# Patient Record
Sex: MALE | Race: WHITE | Employment: FULL TIME | URBAN - METROPOLITAN AREA
[De-identification: names, ages, dates, MRNs, and addresses within clinical notes are randomized per-mention and may not be internally consistent; named-entity substitution may affect disease eponyms.]

---

## 2020-08-17 ENCOUNTER — OFFICE VISIT CONVERTED (OUTPATIENT)
Dept: OTOLARYNGOLOGY | Facility: CLINIC | Age: 30
End: 2020-08-17
Attending: NURSE PRACTITIONER

## 2020-08-27 ENCOUNTER — OFFICE VISIT CONVERTED (OUTPATIENT)
Dept: OTOLARYNGOLOGY | Facility: CLINIC | Age: 30
End: 2020-08-27
Attending: NURSE PRACTITIONER

## 2020-08-27 ENCOUNTER — CONVERSION ENCOUNTER (OUTPATIENT)
Dept: OTHER | Facility: HOSPITAL | Age: 30
End: 2020-08-27

## 2021-05-10 NOTE — H&P
History and Physical      Patient Name: Axel Landaverde   Patient ID: [id number]   Sex: Female   YOB: 1990    Referring Provider: Natalia GARCIA    Visit Date: August 17, 2020    Provider: ELZA Vergara   Location: Ear, N[facility], and Thro[facility]   Location Address: [address] [place] [address], [address]  [place], [place]  [id number]   Location Phone: [phone]          Chief Complaint     1.  Cerumen impaction       History Of Present Illness  Axel Landaverde is a 29 year old female who presents to the office today as a consult from Natalia GARCIA.      He presents to the clinic today for removal of left-sided cerumen impaction.  He reports that he has been seen in the emergency room and also by his PCP who was unable to clear his left-sided cerumen impaction.  He reports he has had issues with this for quite some time.  He has had no issues with middle ear infections, otorrhea or otalgia.  He does report that he feels like his hearing is worse in the left ear and he has intermittent tinnitus.  He reports that he has previously used Q-tips but due to his cerumen impactions he has stopped doing this.  He has not been using any cerumen softening drops.       Past Medical History  Left ear impacted cerumen         Past Surgical History  *Denies any surgical procedures         Medication List  Vitamin D3 50 mcg (2,000 unit) oral tablet         Allergy List  NO KNOWN DRUG ALLERGIES         Family Medical History  Family history of stroke         Social History  Tobacco (Never)         Review of Systems  · Constitutional  o Denies  o : fever, night sweats, weight loss  · Eyes  o Denies  o : discharge from eye, impaired vision  · HENT  o Admits  o : *See HPI  · Cardiovascular  o Denies  o : chest pain, irregular heart beats  · Respiratory  o Denies  o : shortness of breath, wheezing, coughing up blood  · Gastrointestinal  o Denies  o : heartburn, reflux, vomiting blood  · Genitourinary  o Denies  o :  "frequency  · Integument  o Denies  o : rash, skin dryness  · Neurologic  o Denies  o : seizures, loss of balance, loss of consciousness, dizziness  · Endocrine  o Denies  o : cold intolerance, heat intolerance  · Heme-Lymph  o Denies  o : easy bleeding, anemia      Vitals  Date Time BP Position Site L\R Cuff Size HR RR TEMP (F) WT  HT  BMI kg/m2 BSA m2 O2 Sat        08/17/2020 03:39 PM        99 140lbs 0oz 5'  11\" 19.53 1.78           Physical Examination  · Constitutional  o Appearance  o : well developed, well-nourished, alert and in no acute distress, voice clear and strong  · Head and Face  o Head  o :   § Inspection  § : no deformities or lesions  o Face  o :   § Inspection  § : No facial lesions; House-Brackmann I/VI bilaterally  § Palpation  § : No TMJ crepitus nor  muscle tenderness bilaterally  · Eyes  o Vision  o :   § Visual Fields  § : Extraocular movements are intact. No spontaneous or gaze-induced nystagmus.  o Conjunctivae  o : clear  o Sclerae  o : clear  o Pupils and Irises  o : pupils equal, round, and reactive to light.   · Ears, Nose, Mouth and Throat  o Ears  o :   § External Ears  § : appearance within normal limits, no lesions present  § Otoscopic Examination  § : Right tympanic membrane appearance within normal limits, without perforation, well aerated middle ear. Left ear canal completely occluded with very hard cerumen. I was able to remove a portion of this under the microscope using saline solution, suction and curettes, unable to visualize the tympanic membrane  § Hearing  § : intact to conversational voice both ears  o Nose  o :   § External Nose  § : appearance normal  § Intranasal Exam  § : mucosa within normal limits, vestibules normal, no intranasal lesions present, septum midline, sinuses non tender to percussion  o Oral Cavity  o :   § Oral Mucosa  § : oral mucosa normal without pallor or cyanosis  § Lips  § : lip appearance normal  § Teeth  § : normal dentition for " age  § Gums  § : gums pink, non-swollen, no bleeding present  § Tongue  § : tongue appearance normal; normal mobility  § Palate  § : hard palate normal, soft palate appearance normal with symmetric mobility  o Throat  o :   § Oropharynx  § : no inflammation or lesions present, tonsils within normal limits  · Neck  o Inspection/Palpation  o : normal appearance, no masses or tenderness, trachea midline; thyroid size normal, nontender, no nodules or masses present on palpation  · Respiratory  o Respiratory Effort  o : breathing unlabored  o Inspection of Chest  o : normal appearance, no retractions  · Lymphatic  o Neck  o : no lymphadenopathy present  o Supraclavicular Nodes  o : no lymphadenopathy present  o Preauricular Nodes  o : no lymphadenopathy present  · Skin and Subcutaneous Tissue  o General Inspection  o : Regarding face and neck - there are no rashes present, no lesions present, and no areas of discoloration  · Neurologic  o Cranial Nerves  o : cranial nerves II-XII are grossly intact bilaterally  o Gait and Station  o : normal gait, able to stand without diffculty  · Psychiatric  o Judgement and Insight  o : judgment and insight intact  o Mood and Affect  o : mood normal, affect appropriate          Assessment  · Cerumen impaction     380.4/H61.20    Problems Reconciled  Plan  · Orders  o Removal of impacted cerumen (86225) - 380.4/H61.20 - 08/18/2020  · Medications  o Medications have been Reconciled  o Transition of Care or Provider Policy  · Instructions  o He presents to the clinic today for removal of left-sided cerumen impaction. He reports that he has been seen in the emergency room and also by his PCP who was unable to clear his left-sided cerumen impaction. He reports he has had issues with this for quite some time. He has had no issues with middle ear infections, otorrhea or otalgia. He does report that he feels like his hearing is worse in the left ear and he has intermittent tinnitus. He  reports that he has previously used Q-tips but due to his cerumen impactions he has stopped doing this. He has not been using any cerumen softening drops. On examination today the right ear canal is free of any cerumen. The tympanic membrane appearance is within normal limits, without perforations. Middle ear is well aerated. The left ear canal is completely occluded with very hard cerumen. I was able to remove some of this cerumen under the microscope using a saline solution, suction and curette. His ear canal is very irritated. The cerumen is very deep within the canal up against the tympanic membrane. I was unable to visualize the tympanic membrane. I will have him use Debrox solution every day for the next 10 days and then return to see if we can further remove the cerumen impaction.  o Electronically Identified Patient Education Materials Provided Electronically  · Correspondence  o ENT Letter to Referring MD (Natalia GARCIA) - 08/18/2020            Electronically Signed by: ELZA Vergara -Author on August 18, 2020 09:29:45 AM

## 2021-05-13 NOTE — PROGRESS NOTES
Progress Note      Patient Name: Axel Landaverde   Patient ID: [id number]   Sex: Female   YOB: 1990    Referring Provider: Natalia GARCIA    Visit Date: August 27, 2020    Provider: ELZA Vergara   Location: Ear, Nose, and Throat   Location Address: [address] [place] [address], [address] [place] [address]  [id number]   Location Phone: [phone]          Chief Complaint     1.  Cerumen impaction, left ear       History Of Present Illness  Axel Landaverde is a 29 year old female who presents to the office today for a follow-up visit.      He presents the clinic today for 10-day follow-up regarding left-sided complete cerumen impaction.  I was unable to clear this when he was last seen 10 days ago.  He has been using Debrox solution every day for the past 10 days.  He does feel like it is softening and he can feel it move around in his ear canal.  He still feels like he is having a difficult time hearing out of that ear.  He has not had any otorrhea or otalgia.       Past Medical History  Left ear impacted cerumen         Past Surgical History  *Denies any surgical procedures         Medication List  Vitamin D3 50 mcg (2,000 unit) oral tablet         Allergy List  NO KNOWN DRUG ALLERGIES         Family Medical History  Family history of stroke         Social History  Tobacco (Never)         Review of Systems  · Constitutional  o Denies  o : fever, night sweats, weight loss  · Eyes  o Denies  o : discharge from eye, impaired vision  · HENT  o Admits  o : *See HPI  · Cardiovascular  o Denies  o : chest pain, irregular heart beats  · Respiratory  o Denies  o : shortness of breath, wheezing, coughing up blood  · Gastrointestinal  o Denies  o : heartburn, reflux, vomiting blood  · Genitourinary  o Denies  o : frequency  · Integument  o Denies  o : rash, skin dryness  · Neurologic  o Denies  o : seizures, loss of balance, loss of consciousness, dizziness  · Endocrine  o Denies  o : cold intolerance, heat  "intolerance  · Heme-Lymph  o Denies  o : easy bleeding, anemia      Vitals  Date Time BP Position Site L\R Cuff Size HR RR TEMP (F) WT  HT  BMI kg/m2 BSA m2 O2 Sat HC       08/27/2020 04:01 PM        97.8 142lbs 0oz 5'  11\" 19.8 1.8           Physical Examination  · Constitutional  o Appearance  o : well developed, well-nourished, alert and in no acute distress, voice clear and strong  · Head and Face  o Head  o :   § Inspection  § : no deformities or lesions  o Face  o :   § Inspection  § : No facial lesions; House-Brackmann I/VI bilaterally  § Palpation  § : No TMJ crepitus nor  muscle tenderness bilaterally  · Eyes  o Vision  o :   § Visual Fields  § : Extraocular movements are intact. No spontaneous or gaze-induced nystagmus.  o Conjunctivae  o : clear  o Sclerae  o : clear  o Pupils and Irises  o : pupils equal, round, and reactive to light.   · Ears, Nose, Mouth and Throat  o Ears  o :   § External Ears  § : appearance within normal limits, no lesions present  § Otoscopic Examination  § : Left-sided cerumen impaction, completely occluding the canal, I was able to clear this under the microscope using suction and a curette, tympanic membrane appearance within normal limits bilaterally without perforations, well-aerated middle ears  § Hearing  § : intact to conversational voice both ears  o Nose  o :   § External Nose  § : appearance normal  § Intranasal Exam  § : mucosa within normal limits, vestibules normal, no intranasal lesions present, septum midline, sinuses non tender to percussion  o Oral Cavity  o :   § Oral Mucosa  § : oral mucosa normal without pallor or cyanosis  § Lips  § : lip appearance normal  § Teeth  § : normal dentition for age  § Gums  § : gums pink, non-swollen, no bleeding present  § Tongue  § : tongue appearance normal; normal mobility  § Palate  § : hard palate normal, soft palate appearance normal with symmetric mobility  o Throat  o :   § Oropharynx  § : no inflammation or " lesions present, tonsils within normal limits  · Neck  o Inspection/Palpation  o : normal appearance, no masses or tenderness, trachea midline; thyroid size normal, nontender, no nodules or masses present on palpation  · Respiratory  o Respiratory Effort  o : breathing unlabored  o Inspection of Chest  o : normal appearance, no retractions  · Lymphatic  o Neck  o : no lymphadenopathy present  o Supraclavicular Nodes  o : no lymphadenopathy present  o Preauricular Nodes  o : no lymphadenopathy present  · Skin and Subcutaneous Tissue  o General Inspection  o : Regarding face and neck - there are no rashes present, no lesions present, and no areas of discoloration  · Neurologic  o Cranial Nerves  o : cranial nerves II-XII are grossly intact bilaterally  o Gait and Station  o : normal gait, able to stand without diffculty  · Psychiatric  o Judgement and Insight  o : judgment and insight intact  o Mood and Affect  o : mood normal, affect appropriate          Assessment  · Impacted cerumen, left ear     380.4/H61.22    Problems Reconciled  Plan  · Orders  o Removal of impacted cerumen (88239) - 380.4/H61.22 - 08/28/2020  · Instructions  o He presents the clinic today for 10-day follow-up regarding left-sided complete cerumen impaction. I was unable to clear this when he was last seen 10 days ago. He has been using Debrox solution every day for the past 10 days. He does feel like it is softening and he can feel it move around in his ear canal. He still feels like he is having a difficult time hearing out of that ear. He has not had any otorrhea or otalgia. On examination today he has a large cerumen impaction completely occluding the left ear canal. I am able to clear this under the microscope using suction and a curette. He reports that his hearing was immediately improved upon removal of the cerumen impaction. I will have him continue to use the Debrox solution preventatively 2-3 times per week. I will also have him  follow-up if he feels like he is getting a buildup of cerumen.  o Electronically Identified Patient Education Materials Provided Electronically            Electronically Signed by: ELZA Vergara -Author on August 28, 2020 08:56:00 AM

## 2021-05-14 VITALS — HEIGHT: 71 IN | WEIGHT: 142 LBS | TEMPERATURE: 97.8 F | BODY MASS INDEX: 19.88 KG/M2

## 2021-05-14 VITALS — TEMPERATURE: 99 F | BODY MASS INDEX: 19.6 KG/M2 | HEIGHT: 71 IN | WEIGHT: 140 LBS

## 2023-09-06 ENCOUNTER — APPOINTMENT (OUTPATIENT)
Dept: GENERAL RADIOLOGY | Facility: HOSPITAL | Age: 33
End: 2023-09-06
Payer: OTHER MISCELLANEOUS

## 2023-09-06 ENCOUNTER — HOSPITAL ENCOUNTER (EMERGENCY)
Facility: HOSPITAL | Age: 33
Discharge: HOME | End: 2023-09-06
Attending: EMERGENCY MEDICINE
Payer: OTHER MISCELLANEOUS

## 2023-09-06 VITALS
WEIGHT: 130 LBS | BODY MASS INDEX: 18.2 KG/M2 | OXYGEN SATURATION: 96 % | SYSTOLIC BLOOD PRESSURE: 132 MMHG | HEART RATE: 69 BPM | RESPIRATION RATE: 18 BRPM | HEIGHT: 71 IN | TEMPERATURE: 98.1 F | DIASTOLIC BLOOD PRESSURE: 72 MMHG

## 2023-09-06 DIAGNOSIS — S93.402A SPRAIN OF LEFT ANKLE, UNSPECIFIED LIGAMENT, INITIAL ENCOUNTER: Primary | ICD-10-CM

## 2023-09-06 PROCEDURE — 73630 X-RAY EXAM OF FOOT: CPT

## 2023-09-06 PROCEDURE — 73610 X-RAY EXAM OF ANKLE: CPT

## 2023-09-06 PROCEDURE — 96372 THER/PROPH/DIAG INJ SC/IM: CPT

## 2023-09-06 PROCEDURE — 99282 EMERGENCY DEPT VISIT SF MDM: CPT

## 2023-09-06 RX ORDER — NAPROXEN 500 MG/1
500 TABLET ORAL 2 TIMES DAILY WITH MEALS
Qty: 20 TABLET | Refills: 0 | Status: SHIPPED | OUTPATIENT
Start: 2023-09-06

## 2023-09-06 NOTE — ED PROVIDER NOTES
Time: 6:25 PM EDT  Date of encounter:  9/6/2023  Independent Historian/Clinical History and Information was obtained by:   Patient    History is limited by: N/A    Chief Complaint   Patient presents with    Foot Injury         History of Present Illness:  Patient is a 32 y.o. year old male who presents to the emergency department for evaluation of left ankle pain.  Patient's slipped on a wet floor on his right foot and landed on his left ankle.  He works at [employer].  Patient states after the fall he got back up and started working again.  The pain worsened later throughout the day.  He applied ice and useda Ace bandage but states that that made the pain worse    HPI    Patient Care Team  Primary Care Provider: Natalia Singh PA-C    Past Medical History:     No Known Allergies  History reviewed. No pertinent past medical history.  History reviewed. No pertinent surgical history.  Family History   Problem Relation Age of Onset    No Known Problems Mother     No Known Problems Father        Home Medications:  Prior to Admission medications    Medication Sig Start Date End Date Taking? Authorizing Provider   Cholecalciferol 50 MCG (2000 UT) tablet Vitamin D3 50 mcg (2,000 unit) oral tablet take 1 tablet by oral route daily   Active    Emergency, Nurse Epic, RN   naproxen (NAPROSYN) 500 MG tablet Take 1 tablet by mouth 2 (Two) Times a Day With Meals. 9/6/23   Viktoriya Rowe PA-C        Social History:   Social History     Tobacco Use    Smoking status: Former     Packs/day: 0.50     Types: Cigarettes    Smokeless tobacco: Never   Vaping Use    Vaping Use: Every day    Substances: Nicotine, Flavoring   Substance Use Topics    Alcohol use: Yes     Comment: SOCIAL    Drug use: Not Currently         Review of Systems:  Review of Systems   Constitutional: Negative.    HENT: Negative.     Eyes: Negative.    Respiratory: Negative.     Cardiovascular: Negative.    Gastrointestinal: Negative.    Endocrine:  "Negative.    Genitourinary: Negative.    Musculoskeletal:  Positive for arthralgias and joint swelling.   Skin: Negative.  Negative for color change and wound.   Allergic/Immunologic: Negative.    Neurological: Negative.    Hematological: Negative.    Psychiatric/Behavioral: Negative.        Physical Exam:  /72   Pulse 69   Temp 98.1 °F (36.7 °C) (Oral)   Resp 18   Ht 180.3 cm (71\")   Wt 59 kg (130 lb)   SpO2 96%   BMI 18.13 kg/m²         Physical Exam  Vitals and nursing note reviewed.   Constitutional:       Appearance: Normal appearance. He is normal weight.   HENT:      Head: Normocephalic and atraumatic.      Nose: Nose normal.      Mouth/Throat:      Mouth: Mucous membranes are moist.   Eyes:      Extraocular Movements: Extraocular movements intact.      Conjunctiva/sclera: Conjunctivae normal.      Pupils: Pupils are equal, round, and reactive to light.   Cardiovascular:      Rate and Rhythm: Normal rate and regular rhythm.      Heart sounds: Normal heart sounds.   Pulmonary:      Effort: Pulmonary effort is normal.      Breath sounds: Normal breath sounds.   Musculoskeletal:         General: Swelling and tenderness present. Normal range of motion.      Cervical back: Normal range of motion and neck supple.      Left foot: Normal range of motion and normal capillary refill. Swelling present. No tenderness. Normal pulse.        Legs:    Skin:     General: Skin is warm and dry.      Findings: No bruising or erythema.   Neurological:      General: No focal deficit present.      Mental Status: He is alert and oriented to person, place, and time.   Psychiatric:         Mood and Affect: Mood normal.         Behavior: Behavior normal.               Procedures:  Procedures      Medical Decision Making:      Comorbidities that affect care:    None    External Notes reviewed:    Previous Clinic Note: Urgent care visit on December 15, 2021 for corneal injury right eye      The following orders were placed " and all results were independently analyzed by me:  Orders Placed This Encounter   Procedures    XR Foot 3+ View Left    XR Ankle 3+ View Left       Medications Given in the Emergency Department:  Medications - No data to display     ED Course:    The patient was initially evaluated in the triage area where orders were placed. The patient was later dispositioned by Viktoriya Rowe PA-C.      The patient was advised to stay for completion of workup which includes but is not limited to communication of labs and radiological results, reassessment and plan. The patient was advised that leaving prior to disposition by a provider could result in critical findings that are not communicated to the patient.          Labs:    Lab Results (last 24 hours)       ** No results found for the last 24 hours. **             Imaging:    XR Ankle 3+ View Left    Result Date: 9/6/2023  PROCEDURE: XR ANKLE 3+ VW LEFT  COMPARISON: [facility], CR, XR FOOT 3+ VW LEFT, 9/06/2023, 17:46.  INDICATIONS: LEFT ANKLE PAIN; FALL INJURY AT WORK  FINDINGS:  BONES: Normal.  No significant arthropathy or acute abnormality.  SOFT TISSUES: Mild soft tissue swelling is present. EFFUSION: None visible.  OTHER: Negative.        Mild soft tissue swelling.  No acute osseous abnormalities are identified in the left ankle.      CANDY REID MD       Electronically Signed and Approved By: CANDY REID MD on 9/06/2023 at 18:04             XR Foot 3+ View Left    Result Date: 9/6/2023  PROCEDURE: XR FOOT 3+ VW LEFT  COMPARISON: None  INDICATIONS: LEFT FOOT PAIN; FALL INJURY AT WORK  FINDINGS:  BONES: Normal.  No significant arthropathy or acute abnormality.  SOFT TISSUES: Negative.  No visible soft tissue swelling.  EFFUSION: None visible.  OTHER: Negative.        Normal examination.       CANDY REID MD       Electronically Signed and Approved By: CANDY REID MD on 9/06/2023 at 18:07                Differential Diagnosis and  Discussion:      Extremity Pain: Differential diagnosis includes but is not limited to soft tissue sprain, tendonitis, tendon injury, dislocation, fracture, deep vein thrombosis, arterial insufficiency, osteoarthritis, bursitis, and ligamentous damage.  Joint Pain: Differential diagnosis includes but is not limited to polyarticular arthritis, gout, tendinitis, hemarthrosis, septic arthritis, rheumatoid arthritis, bursitis, degenerative joint disease, joint effusion, autoimmune disorder, trauma, and occult neoplasm.    All X-rays impressions were independently interpreted by me.    [facility]           Patient Care Considerations:    NARCOTICS: I considered prescribing opiate pain medication as an outpatient, however x-rays negative for any fractures or dislocations.  Conservative treatment warranted.      Consultants/Shared Management Plan:    None    Social Determinants of Health:    Patient is independent, reliable, and has access to care.       Disposition and Care Coordination:    Discharged: The patient is suitable and stable for discharge with no need for consideration of observation or admission.    I have explained the patient´s condition, diagnoses and treatment plan based on the information available to me at this time. I have answered questions and addressed any concerns. The patient has a good  understanding of the patient´s diagnosis, condition, and treatment plan as can be expected at this point. The vital signs have been stable. The patient´s condition is stable and appropriate for discharge from the emergency department.      The patient will pursue further outpatient evaluation with the primary care physician or other designated or consulting physician as outlined in the discharge instructions. They are agreeable to this plan of care and follow-up instructions have been explained in detail. The patient has received these instructions in written format and have expressed an understanding of the discharge  instructions. The patient is aware that any significant change in condition or worsening of symptoms should prompt an immediate return to this or the closest emergency department or call to 911.  I have explained discharge medications and the need for follow up with the patient/caretakers. This was also printed in the discharge instructions. Patient was discharged with the following medications and follow up:      Medication List        New Prescriptions      naproxen 500 MG tablet  Commonly known as: NAPROSYN  Take 1 tablet by mouth 2 (Two) Times a Day With Meals.               Where to Get Your Medications        These medications [place] [facility] [place] [address] - [phone]  - [phone]  [address], [facility] [address]      Phone: [phone]   naproxen 500 MG tablet      Ryan Lay MD  [address] [place], [place] [id number]  [phone]    Schedule an appointment as soon as possible for a visit   If symptoms worsen       Final diagnoses:   Sprain of left ankle, unspecified ligament, initial encounter        ED Disposition       ED Disposition   Discharge    Condition   Stable    Comment   --               This medical record created using voice recognition software.             Viktoriya Rowe PA-C  09/06/23 8215